# Patient Record
Sex: MALE | Race: WHITE | URBAN - METROPOLITAN AREA
[De-identification: names, ages, dates, MRNs, and addresses within clinical notes are randomized per-mention and may not be internally consistent; named-entity substitution may affect disease eponyms.]

---

## 2018-09-01 ENCOUNTER — APPOINTMENT (OUTPATIENT)
Dept: GENERAL RADIOLOGY | Facility: CLINIC | Age: 23
End: 2018-09-01
Attending: PHYSICIAN ASSISTANT
Payer: COMMERCIAL

## 2018-09-01 ENCOUNTER — HOSPITAL ENCOUNTER (EMERGENCY)
Facility: CLINIC | Age: 23
Discharge: HOME | End: 2018-09-01
Attending: PHYSICIAN ASSISTANT | Admitting: PHYSICIAN ASSISTANT
Payer: COMMERCIAL

## 2018-09-01 VITALS
SYSTOLIC BLOOD PRESSURE: 161 MMHG | HEART RATE: 64 BPM | TEMPERATURE: 97.8 F | RESPIRATION RATE: 16 BRPM | DIASTOLIC BLOOD PRESSURE: 90 MMHG | OXYGEN SATURATION: 95 % | WEIGHT: 210 LBS

## 2018-09-01 DIAGNOSIS — M79.5 FOREIGN BODY (FB) IN SOFT TISSUE: ICD-10-CM

## 2018-09-01 PROCEDURE — 10120 INC&RMVL FB SUBQ TISS SMPL: CPT | Mod: Z6 | Performed by: EMERGENCY MEDICINE

## 2018-09-01 PROCEDURE — 10120 INC&RMVL FB SUBQ TISS SMPL: CPT | Performed by: PHYSICIAN ASSISTANT

## 2018-09-01 PROCEDURE — 73140 X-RAY EXAM OF FINGER(S): CPT | Mod: LT

## 2018-09-01 PROCEDURE — G0463 HOSPITAL OUTPT CLINIC VISIT: HCPCS | Mod: 25 | Performed by: PHYSICIAN ASSISTANT

## 2018-09-01 PROCEDURE — 99214 OFFICE O/P EST MOD 30 MIN: CPT | Mod: 25 | Performed by: PHYSICIAN ASSISTANT

## 2018-09-01 NOTE — ED PROVIDER NOTES
Patient was seen in urgent care room 3 at the request of PA provider, Jerica Unger.      Patient had a fishhook that was embedded in the ulnar aspect of the left thumb between the metacarpal phalangeal joint, and the IP joint.  PA was unable to remove the fish hook, which is now embedded under the skin, and at that point I was asked to provide additional assistance to remove the fish hook.      Patient examined by myself in room 3.  There is for stroke which is able to be palpated underneath the skin, however no visualized hook is present above the skin surface.  I attempted to palpate, and localize from the surface utilizing small needle, however difficult to stabilize the hook, and therefore decision was made to make a larger incision approximately 1.5 cm in length, extending approximately 0.5 cm in depth.  This allowed for greater access to the area where the fish hook was able to be palpated.  Subsequently I was able to grasp for stroke with forceps, and able to utilize 11 blade scalpel to free the barbed end of the fishhook.  Patient subsequently had straight line incision which was closed with 2 simple interrupted sutures utilizing 4-0 nylon.  Previously, digital block had been performed with 3 cc of 0.25% bupivacaine without epinephrine.  Patient tolerated procedure well.  No further bleeding was noted.  The fishhook was superficial, and do not feel that this injured any nerves.  There is no arterial bleeding, swelling, or other concerns for more significant injury.  Physician assistant plans on providing antibiotic medication for the patient for home    Diagnosis: Maury removal    Richmond Gaytan, Richmond Warren MD  09/01/18 1937

## 2018-09-01 NOTE — ED PROVIDER NOTES
History     Chief Complaint   Patient presents with     fish hook     fish hook in L thumb     HPI  Gustavo Moya is a 23 year old male who is in urgent care with concern over embedded fishhook in his left thumb.  Patient states that he was fishing just prior to arrival and when he attempted to remove fish from hook and two prongs of hook  became embedded in his left hand.  He was able to remove 1 of them from the webspace area of the second finger, however he was unable to move hook at the base of the thumb which he then cut off flush with the skin.  He complains of mild to moderate pain  He also notes some decreased sensation to the distal tip on affected side.  He is unsure the date of his last tetanus vaccine.      Problem List:    There are no active problems to display for this patient.     Past Medical History:    No past medical history on file.    Past Surgical History:    No past surgical history on file.    Family History:    No family history on file.    Social History:  Marital Status:  Single [1]  Social History   Substance Use Topics     Smoking status: Not on file     Smokeless tobacco: Not on file     Alcohol use Not on file      Medications:      amoxicillin-clavulanate (AUGMENTIN) 875-125 MG per tablet     Review of Systems  CONSTITUTIONAL:NEGATIVE for fever, chills, change in weight  INTEGUMENTARY/SKIN: POSITIVE for puncture wound to left thumb with embedded fishhook   RESP:NEGATIVE for significant cough or SOB  MUSCULOSKELETAL: POSITIVE  for left thumb pain   NEURO: POSITIVE for decreased sensation to the distal thumb   Physical Exam   BP: 161/90  Pulse: 64  Temp: 97.8  F (36.6  C)  Resp: 16  Weight: 95.3 kg (210 lb)  SpO2: 95 %  Physical Exam   Constitutional: He appears well-developed and well-nourished. No distress.   Cardiovascular:   Pulses:       Radial pulses are 2+ on the right side   Musculoskeletal:        Left wrist: Normal.        Left hand: He exhibits tenderness. He exhibits  normal range of motion, no bony tenderness, normal capillary refill, no deformity and no swelling.        Hands:  Neurological:   Sensation to light touch of entire left thumb is grossly intact    Skin: No abrasion, no ecchymosis and no laceration noted. No erythema.   puncture wound left thumb        ED Course     ED Course     FB removal  Performed by: ISAIAS JEAN  Authorized by: ISAIAS JEAN   Consent: Verbal consent obtained.  Risks and benefits: risks, benefits and alternatives were discussed  Consent given by: patient  Patient identity confirmed: verbally with patient  Body area: skin  General location: upper extremity  Location details: left thumb  Anesthesia: local infiltration    Anesthesia:  Local Anesthetic: lidocaine 1% without epinephrine  Anesthetic total: 2 mL  Post-procedure assessment: foreign body not removed              Critical Care time:  none            Results for orders placed or performed during the hospital encounter of 09/01/18 (from the past 24 hour(s))   Fingers XR, 2-3 views, left    Narrative    FINGER(S) TWO-THREE VIEWS LEFT  9/1/2018 5:23 PM     HISTORY: puncture from fishhook, assess for location of foreign body;     COMPARISON: None.      Impression    IMPRESSION: There is normal osseous alignment.  No fractures are  identified.  A metallic foreign body is seen in the soft tissues just  medial 2 the proximal phalanx of the thumb. This is consistent with a  fishhook.    MIO ANGELES MD     Medications - No data to display     Assessments & Plan (with Medical Decision Making)     I have reviewed the nursing notes.    I have reviewed the findings, diagnosis, plan and need for follow up with the patient.       New Prescriptions    AMOXICILLIN-CLAVULANATE (AUGMENTIN) 875-125 MG PER TABLET    Take 1 tablet by mouth 2 times daily for 5 days     Final diagnoses:   Foreign body (FB) in soft tissue     23-year-old male presents to urgent care with concern over foreign body embedded in  his left thumb which consisted of fishhook that he cut off flush with the skin.  He had stable vital signs upon arrival.  Physical exam findings as described above.  After discussing her/benefits patient agreed to proceed with attempted removal.  Skin was infected with Betadine and 2 cc of lidocaine was infiltrated into the area.  I made multiple attempts at removal including making 2 small laceration at both the entrance and expected exit area where skin tenting was present however was unable to remove object.  X-ray was then obtained to help visualize orientation.  After another repeat attempt was unsuccessful by consulted with ER physician Dr. Gaytan agreed to evaluate patient.  He was able to remove fishhook by making a larger incision and then closing wound with two lacerations.  Please refer to his note for procedure documentation.   patient was discharged home with prescription for Augmentin to prevent secondary infection.  He was instructed to use warm compresses to the area 2-3 times daily for the next 2 days. Follow up with PCP for suture removal in the next 7-10 days or with ortho hand specialist if subjective numbness persists.   Wound care instructions, signs of infection, worrisome reasons to return to ER/UC sooner discussed.    Disclaimer: This note consists of symbols derived from keyboarding, dictation, and/or voice recognition software. As a result, there may be errors in the script that have gone undetected.  Please consider this when interpreting information found in the chart.    9/1/2018   [facility] EMERGENCY DEPARTMENT     Jerica Unger PA-C  09/02/18 [id number]

## 2018-09-01 NOTE — DISCHARGE INSTRUCTIONS
Foreign Object Under the Skin (Removed)  An object has been removed from under your skin. Although care was taken to remove all of it, there is always a chance that a small piece may have been left behind.  Most skin wounds heal without problems. But there can be an increased risk of infection if anything stays under the skin. Sometimes the pieces work their way out on their own, and sometimes they can cause an infection. Very small pieces that stay under the skin usually don t cause a problem or need further treatment.  Home care  Wound care    Keep the wound clean and dry.    If there is a dressing or bandage, change it when it gets wet or dirty. Otherwise, leave it on for the first 24 hours, then change it once a day or as often as you were instructed.    If stitches or staples were used, clean the wound every day:  ? After taking off the dressing, wash the area gently with soap and water.  ? Apply a thin layer of antibiotic ointment to the cut. This will keep the wound clean and make it easier to remove the stitches. If it is oozing a lot, you can put a nonstick dressing over it. Then reapply the bandage or dressing as you were instructed.  ? You can get it wet, just like when you clean it. This means you can shower as usual for the first 24 hours, but do not soak the area in water (no baths or swimming) until the stitches or staples are taken out.    If surgical tape or strips were used, keep the area clean and dry. If it becomes wet, blot it dry with a towel.    Medicine    You can take over-the-counter medicine for pain, unless you were given a different pain medicine to use. If you have chronic liver or kidney disease or ever had a stomach ulcer, or gastrointestinal bleeding or are taking blood thinner medicines, talk with your healthcare provider before using these medicines.    If you were given antibiotics, take them until they are used up. It is important to finish the antibiotics even if the wound  looks better to make sure the infection clears.  Follow-up care  Follow up your healthcare provider, or as advised. Keep in mind the following:    Watch for any signs of infection, such as increasing pain, redness, swelling, or pus drainage. If this happens, don t wait for your scheduled visit, rather see your healthcare provider sooner.    Stitches or staples are usually taken out within 5 to 14 days. This varies depending on what part of your body they are on, and the type of wound. The healthcare provider will tell you how long they should be left in.    If surgical tape or strips were used, they are usually left on for 7 to 10 days. You can remove them after that unless you were told otherwise. If you try to remove them, and it is too difficult, soaking can help. If the edges of the cut pull apart, then stop removing the tape, and follow up with your healthcare provider.    If X-rays were taken, you will be told if there are new findings that may affect your care.  When to seek medical care  Call your healthcare provider right away if any of these occur:    Fever of 100.4 F (38 C) or higher, or as directed by your healthcare provider    Increasing pain in the wound    Redness, swelling or pus coming from the wound  Date Last Reviewed: 10/1/2016    3833-2327 [employer]. [address] [place] [address], [place] [address]. All rights reserved. This information is not intended as a substitute for professional medical care. Always follow your healthcare professional's instructions.

## 2018-09-01 NOTE — ED AVS SNAPSHOT
[facility]    [address] [address]    Phone:  [phone]    Fax:  [phone]                                       Gustavo Moya   MRN: [id number]    Department:  [facility]   Date of Visit:  9/1/2018           After Visit Summary Signature Page     I have received my discharge instructions, and my questions have been answered. I have discussed any challenges I see with this plan with the nurse or doctor.    ..........................................................................................................................................  Patient/Patient Representative Signature      ..........................................................................................................................................  Patient Representative Print Name and Relationship to Patient    ..................................................               ................................................  Date                                            Time    ..........................................................................................................................................  Reviewed by Signature/Title    ...................................................              ..............................................  Date                                                            Time          22EPIC Rev 08/18

## 2018-09-01 NOTE — ED AVS SNAPSHOT
[facility]    [address]    [facility] [address]    Phone:  [phone]    Fax:  [phone]                                       Gustavo Moya   MRN: [id number]    Department:  [facility] Emergency Department   Date of Visit:  9/1/2018           Patient Information     Date Of Birth          1995        Your diagnoses for this visit were:     Foreign body (FB) in soft tissue        You were seen by Jerica Unger PA-C.      Follow-up Information     Follow up with [facility].    Why:  as needed if persistent numbness otherwise return to ER/UC if signs of infection develop.     Contact information:    [facility] [facility] [address] [phone]          Please follow up.    Why:  suture removal in 10 days         Discharge Instructions         Foreign Object Under the Skin (Removed)  An object has been removed from under your skin. Although care was taken to remove all of it, there is always a chance that a small piece may have been left behind.  Most skin wounds heal without problems. But there can be an increased risk of infection if anything stays under the skin. Sometimes the pieces work their way out on their own, and sometimes they can cause an infection. Very small pieces that stay under the skin usually don t cause a problem or need further treatment.  Home care  Wound care    Keep the wound clean and dry.    If there is a dressing or bandage, change it when it gets wet or dirty. Otherwise, leave it on for the first 24 hours, then change it once a day or as often as you were instructed.    If stitches or staples were used, clean the wound every day:  ? After taking off the dressing, wash the area gently with soap and water.  ? Apply a thin layer of antibiotic ointment to the cut. This will keep the wound clean and make it easier to remove the stitches. If it is oozing a lot, you can put a nonstick dressing over it. Then  reapply the bandage or dressing as you were instructed.  ? You can get it wet, just like when you clean it. This means you can shower as usual for the first 24 hours, but do not soak the area in water (no baths or swimming) until the stitches or staples are taken out.    If surgical tape or strips were used, keep the area clean and dry. If it becomes wet, blot it dry with a towel.    Medicine    You can take over-the-counter medicine for pain, unless you were given a different pain medicine to use. If you have chronic liver or kidney disease or ever had a stomach ulcer, or gastrointestinal bleeding or are taking blood thinner medicines, talk with your healthcare provider before using these medicines.    If you were given antibiotics, take them until they are used up. It is important to finish the antibiotics even if the wound looks better to make sure the infection clears.  Follow-up care  Follow up your healthcare provider, or as advised. Keep in mind the following:    Watch for any signs of infection, such as increasing pain, redness, swelling, or pus drainage. If this happens, don t wait for your scheduled visit, rather see your healthcare provider sooner.    Stitches or staples are usually taken out within 5 to 14 days. This varies depending on what part of your body they are on, and the type of wound. The healthcare provider will tell you how long they should be left in.    If surgical tape or strips were used, they are usually left on for 7 to 10 days. You can remove them after that unless you were told otherwise. If you try to remove them, and it is too difficult, soaking can help. If the edges of the cut pull apart, then stop removing the tape, and follow up with your healthcare provider.    If X-rays were taken, you will be told if there are new findings that may affect your care.  When to seek medical care  Call your healthcare provider right away if any of these occur:    Fever of 100.4 F (38 C) or  higher, or as directed by your healthcare provider    Increasing pain in the wound    Redness, swelling or pus coming from the wound  Date Last Reviewed: 10/1/2016    4837-7311 [employer]. [address] [place] [address], [place] [address]. All rights reserved. This information is not intended as a substitute for professional medical care. Always follow your healthcare professional's instructions.          24 Hour Appointment Hotline       To make an appointment at any [facility], call [phone] ([phone]). If you don't have a family doctor or clinic, we will help you find one. [facility] are conveniently located to serve the needs of you and your family.             Review of your medicines      START taking        Dose / Directions Last dose taken    amoxicillin-clavulanate 875-125 MG per tablet   Commonly known as:  AUGMENTIN   Dose:  1 tablet   Quantity:  10 tablet        Take 1 tablet by mouth 2 times daily for 5 days   Refills:  0                Prescriptions were sent or printed at these locations (1 Prescription)                   [facility] [address] [address]    Telephone:  [phone]   Fax:  [phone]   Hours:                  E-Prescribed (1 of 1)         amoxicillin-clavulanate (AUGMENTIN) 875-125 MG per tablet                Procedures and tests performed during your visit     Fingers XR, 2-3 views, left      Orders Needing Specimen Collection     None      Pending Results     No orders found from 8/30/2018 to 9/2/2018.            Pending Culture Results     No orders found from 8/30/2018 to 9/2/2018.            Pending Results Instructions     If you had any lab results that were not finalized at the time of your Discharge, you can call the ED Lab Result RN at [phone]. You will be contacted by this team for any positive Lab results or changes in treatment. The nurses are available 7 days a week  "from 10A to 6:30P.  You can leave a message 24 hours per day and they will return your call.        Test Results From Your Hospital Stay        2018  5:56 PM      Narrative     FINGER(S) TWO-THREE VIEWS LEFT  2018 5:23 PM     HISTORY: puncture from fishhook, assess for location of foreign body;     COMPARISON: None.        Impression     IMPRESSION: There is normal osseous alignment.  No fractures are  identified.  A metallic foreign body is seen in the soft tissues just  medial 2 the proximal phalanx of the thumb. This is consistent with a  fishhook.    MIO ANGELES MD                Thank you for choosing [facility]       Thank you for choosing [facility] for your care. Our goal is always to provide you with excellent care. Hearing back from our patients is one way we can continue to improve our services. Please take a few minutes to complete the written survey that you may receive in the mail after you visit with us. Thank you!        [employer] Information     [employer] lets you send messages to your doctor, view your test results, renew your prescriptions, schedule appointments and more. To sign up, go to [email or web address] . Click on \"Log in\" on the left side of the screen, which will take you to the Welcome page. Then click on \"Sign up Now\" on the right side of the page.     You will be asked to enter the access code listed below, as well as some personal information. Please follow the directions to create your username and password.     Your access code is: [email or web address]  Expires: 2018  6:38 PM     Your access code will  in 90 days. If you need help or a new code, please call your [facility] clinic or [phone].        Care EveryWhere ID     This is your Care EveryWhere ID. This could be used by other organizations to access your [facility] medical records  [id number]        Equal Access to Services     MARK PISANO AH: diane Elkins, bhavin nguyen, " jenny brink'aan ah. So [facility] [phone].    ATENCIÓN: Si habla español, tiene a samuels disposición servicios gratuitos de asistencia lingüística. Llame al [phone].    We comply with applicable federal civil rights laws and [place] laws. We do not discriminate on the basis of race, color, national origin, age, disability, sex, sexual orientation, or gender identity.            After Visit Summary       This is your record. Keep this with you and show to your community pharmacist(s) and doctor(s) at your next visit.